# Patient Record
Sex: FEMALE | Race: WHITE | NOT HISPANIC OR LATINO | Employment: FULL TIME | URBAN - METROPOLITAN AREA
[De-identification: names, ages, dates, MRNs, and addresses within clinical notes are randomized per-mention and may not be internally consistent; named-entity substitution may affect disease eponyms.]

---

## 2019-06-26 PROBLEM — Z79.899 ENCOUNTER FOR LONG-TERM (CURRENT) USE OF MEDICATIONS: Status: ACTIVE | Noted: 2019-06-26

## 2019-06-26 PROBLEM — E06.3 HASHIMOTO'S DISEASE: Status: ACTIVE | Noted: 2019-06-19

## 2019-06-26 PROBLEM — R31.9 HEMATURIA, UNDIAGNOSED CAUSE: Status: ACTIVE | Noted: 2019-06-26

## 2019-06-26 PROBLEM — Z51.81 ENCOUNTER FOR THERAPEUTIC DRUG MONITORING: Status: ACTIVE | Noted: 2019-06-26

## 2019-06-26 PROBLEM — Z71.3 DIETARY COUNSELING: Status: ACTIVE | Noted: 2019-06-26

## 2019-06-26 PROBLEM — M62.830 SPASM OF LUMBAR PARASPINOUS MUSCLE: Status: ACTIVE | Noted: 2019-06-26

## 2019-06-26 PROBLEM — R00.2 PALPITATIONS: Status: ACTIVE | Noted: 2019-06-26

## 2019-06-26 PROBLEM — R10.9 ACUTE LEFT FLANK PAIN: Status: ACTIVE | Noted: 2019-06-26

## 2019-07-15 PROBLEM — Z09 FOLLOW-UP EXAMINATION FOLLOWING COMBINED TREATMENT: Status: ACTIVE | Noted: 2019-07-15

## 2019-09-10 PROBLEM — G93.32 CHRONIC FATIGUE SYNDROME: Status: ACTIVE | Noted: 2019-09-10

## 2019-09-10 PROBLEM — I49.3 PVC (PREMATURE VENTRICULAR CONTRACTION): Chronic | Status: ACTIVE | Noted: 2019-09-10

## 2019-09-10 PROBLEM — M62.831 CHARLEYHORSE: Status: ACTIVE | Noted: 2019-09-10

## 2019-09-10 PROBLEM — F15.90 OTHER STIMULANT USE, UNSPECIFIED, UNCOMPLICATED: Status: ACTIVE | Noted: 2019-09-10

## 2019-09-10 PROBLEM — G58.9 PINCHED NERVE: Chronic | Status: ACTIVE | Noted: 2018-11-01

## 2019-09-10 PROBLEM — Z86.19 HISTORY OF EPSTEIN-BARR VIRUS INFECTION: Status: ACTIVE | Noted: 2019-09-10

## 2019-10-14 PROBLEM — Z09 FOLLOW-UP EXAMINATION FOLLOWING COMBINED TREATMENT: Status: RESOLVED | Noted: 2019-07-15 | Resolved: 2019-10-14

## 2019-11-20 ENCOUNTER — HOSPITAL ENCOUNTER (EMERGENCY)
Facility: HOSPITAL | Age: 54
Discharge: HOME | End: 2019-11-20
Attending: EMERGENCY MEDICINE
Payer: COMMERCIAL

## 2019-11-20 VITALS
SYSTOLIC BLOOD PRESSURE: 120 MMHG | OXYGEN SATURATION: 98 % | RESPIRATION RATE: 16 BRPM | DIASTOLIC BLOOD PRESSURE: 79 MMHG | TEMPERATURE: 98 F | HEART RATE: 70 BPM

## 2019-11-20 DIAGNOSIS — R10.84 GENERALIZED ABDOMINAL PAIN: Primary | ICD-10-CM

## 2019-11-20 DIAGNOSIS — R07.9 CHEST PAIN: ICD-10-CM

## 2019-11-20 LAB
ALBUMIN SERPL BCP-MCNC: 4.3 G/DL (ref 3.5–5.2)
ALP SERPL-CCNC: 66 U/L (ref 55–135)
ALT SERPL W/O P-5'-P-CCNC: 16 U/L (ref 10–44)
AMYLASE SERPL-CCNC: 36 U/L (ref 20–110)
ANION GAP SERPL CALC-SCNC: 7 MMOL/L (ref 8–16)
AST SERPL-CCNC: 20 U/L (ref 10–40)
BACTERIA #/AREA URNS HPF: ABNORMAL /HPF
BASOPHILS # BLD AUTO: 0.02 K/UL (ref 0–0.2)
BASOPHILS NFR BLD: 0.4 % (ref 0–1.9)
BILIRUB SERPL-MCNC: 0.4 MG/DL (ref 0.1–1)
BILIRUB UR QL STRIP: NEGATIVE
BUN SERPL-MCNC: 12 MG/DL (ref 6–20)
CALCIUM SERPL-MCNC: 9.8 MG/DL (ref 8.7–10.5)
CHLORIDE SERPL-SCNC: 106 MMOL/L (ref 95–110)
CLARITY UR: CLEAR
CO2 SERPL-SCNC: 28 MMOL/L (ref 23–29)
COLOR UR: YELLOW
CREAT SERPL-MCNC: 0.8 MG/DL (ref 0.5–1.4)
DIFFERENTIAL METHOD: NORMAL
EOSINOPHIL # BLD AUTO: 0.1 K/UL (ref 0–0.5)
EOSINOPHIL NFR BLD: 0.9 % (ref 0–8)
ERYTHROCYTE [DISTWIDTH] IN BLOOD BY AUTOMATED COUNT: 11.9 % (ref 11.5–14.5)
EST. GFR  (AFRICAN AMERICAN): >60 ML/MIN/1.73 M^2
EST. GFR  (NON AFRICAN AMERICAN): >60 ML/MIN/1.73 M^2
GLUCOSE SERPL-MCNC: 109 MG/DL (ref 70–110)
GLUCOSE UR QL STRIP: NEGATIVE
HCT VFR BLD AUTO: 39.1 % (ref 37–48.5)
HGB BLD-MCNC: 12.9 G/DL (ref 12–16)
HGB UR QL STRIP: ABNORMAL
IMM GRANULOCYTES # BLD AUTO: 0.01 K/UL (ref 0–0.04)
IMM GRANULOCYTES NFR BLD AUTO: 0.2 % (ref 0–0.5)
KETONES UR QL STRIP: NEGATIVE
LEUKOCYTE ESTERASE UR QL STRIP: NEGATIVE
LIPASE SERPL-CCNC: 31 U/L (ref 4–60)
LYMPHOCYTES # BLD AUTO: 1.9 K/UL (ref 1–4.8)
LYMPHOCYTES NFR BLD: 35.8 % (ref 18–48)
MCH RBC QN AUTO: 29.5 PG (ref 27–31)
MCHC RBC AUTO-ENTMCNC: 33 G/DL (ref 32–36)
MCV RBC AUTO: 89 FL (ref 82–98)
MICROSCOPIC COMMENT: ABNORMAL
MONOCYTES # BLD AUTO: 0.3 K/UL (ref 0.3–1)
MONOCYTES NFR BLD: 5.2 % (ref 4–15)
NEUTROPHILS # BLD AUTO: 3.1 K/UL (ref 1.8–7.7)
NEUTROPHILS NFR BLD: 57.5 % (ref 38–73)
NITRITE UR QL STRIP: NEGATIVE
NRBC BLD-RTO: 0 /100 WBC
PH UR STRIP: 6 [PH] (ref 5–8)
PLATELET # BLD AUTO: 238 K/UL (ref 150–350)
PMV BLD AUTO: 9.3 FL (ref 9.2–12.9)
POTASSIUM SERPL-SCNC: 3.9 MMOL/L (ref 3.5–5.1)
PROT SERPL-MCNC: 7.4 G/DL (ref 6–8.4)
PROT UR QL STRIP: NEGATIVE
RBC # BLD AUTO: 4.38 M/UL (ref 4–5.4)
RBC #/AREA URNS HPF: 5 /HPF (ref 0–4)
SODIUM SERPL-SCNC: 141 MMOL/L (ref 136–145)
SP GR UR STRIP: 1.02 (ref 1–1.03)
SQUAMOUS #/AREA URNS HPF: 1 /HPF
URN SPEC COLLECT METH UR: ABNORMAL
UROBILINOGEN UR STRIP-ACNC: NEGATIVE EU/DL
WBC # BLD AUTO: 5.34 K/UL (ref 3.9–12.7)
WBC #/AREA URNS HPF: 1 /HPF (ref 0–5)

## 2019-11-20 PROCEDURE — 83690 ASSAY OF LIPASE: CPT

## 2019-11-20 PROCEDURE — 25000003 PHARM REV CODE 250: Performed by: EMERGENCY MEDICINE

## 2019-11-20 PROCEDURE — 85025 COMPLETE CBC W/AUTO DIFF WBC: CPT

## 2019-11-20 PROCEDURE — 63600175 PHARM REV CODE 636 W HCPCS: Performed by: EMERGENCY MEDICINE

## 2019-11-20 PROCEDURE — 36415 COLL VENOUS BLD VENIPUNCTURE: CPT

## 2019-11-20 PROCEDURE — 99284 EMERGENCY DEPT VISIT MOD MDM: CPT | Mod: 25

## 2019-11-20 PROCEDURE — 80053 COMPREHEN METABOLIC PANEL: CPT

## 2019-11-20 PROCEDURE — 81000 URINALYSIS NONAUTO W/SCOPE: CPT

## 2019-11-20 PROCEDURE — 82150 ASSAY OF AMYLASE: CPT

## 2019-11-20 RX ORDER — SIMETHICONE 80 MG
2 TABLET,CHEWABLE ORAL 3 TIMES DAILY PRN
Status: DISCONTINUED | OUTPATIENT
Start: 2019-11-20 | End: 2019-11-20 | Stop reason: HOSPADM

## 2019-11-20 RX ORDER — KETOROLAC TROMETHAMINE 30 MG/ML
60 INJECTION, SOLUTION INTRAMUSCULAR; INTRAVENOUS
Status: DISCONTINUED | OUTPATIENT
Start: 2019-11-20 | End: 2019-11-20 | Stop reason: HOSPADM

## 2019-11-20 RX ADMIN — LIDOCAINE HYDROCHLORIDE 50 ML: 20 SOLUTION ORAL; TOPICAL at 10:11

## 2019-11-20 RX ADMIN — SIMETHICONE CHEW TAB 80 MG 160 MG: 80 TABLET ORAL at 10:11

## 2019-11-20 NOTE — ED PROVIDER NOTES
[facility]                                                  Chief Complaint  54 y.o. female with Abdominal Pain    History of Present Illness  Ana Luke presents to the emergency room complaining of pain and bloating after an EGD yesterday.  Patient reports that she felt fine after the procedure but suddenly had bloating, diarrhea and pressure this morning.  She has not had any fever.  She denies chest pain shortness of breath. She does not feel dizzy or weak.    Past Medical History:   Diagnosis Date    Anhedonia     Autoimmune disorder     Decreased libido     Endometriosis     Esophagitis 05/2016    Caceres ANTHONY  Stage II    Female pattern hair loss     Fibrocystic breast changes of both breasts     Food intolerance in adult     wheat, egg whites, casein, corn, peanuts    GERD (gastroesophageal reflux disease)     Goiter, nontoxic simple     Hashimoto's disease 06/19/2019    Sissam    Hemorrhoids     flares with UC    Hiatal hernia     History of Merissa-Barr virus infection 2016    with CMV    History of pulmonary embolus (PE) 12/2003    TIA, from HRT    Hormone imbalance     HPV (human papilloma virus) infection 06/2014    Culotta    HSV-2 (herpes simplex virus 2) infection 2001    Hypoglycemia     Lower back pain     Menopausal and female climacteric states     Migraine with aura     Multiple food allergies     wheat, corn, casein, egg whites, peanuts    MVP (mitral valve prolapse) 08/1997    Pinched nerve 11/2018    LUMBAR SPASM L4-5    Pre-diabetes     PVC (premature ventricular contraction) 9/10/2019    Scoliosis of lumbar region due to degenerative disease of spine in adult     TMJ (temporomandibular joint disorder)     UC (ulcerative colitis)      Past Surgical History:   Procedure Laterality Date    CARPAL TUNNEL RELEASE Right 2005    CYSTOSCOPY  11/1998    Culotta endometriosis    EXPLORATORY LAPAROTOMY  02/1998    Ira    instrument removal   1997    Ira; tip of a suction catheter    LAPAROSCOPIC CHOLECYSTECTOMY  1996    Caceres ANTHONY    LAPAROTOMY, EXPLORATORY  02/1999    Culotta severe endometriosis    TOTAL ABDOMINAL HYSTERECTOMY W/ BILATERAL SALPINGOOPHORECTOMY  1997    Larisebaljeet      Review of patient's allergies indicates:   Allergen Reactions    Casein     Codeine     Corn containing products     Hydromorphone     Meperidine     Morphine     Peanut     Wheat containing prod         Review of Systems and Physical Exam     Review of Systems  -- Constitution - no fever, no weight loss, no loss of consciousness  -- Eyes - no changes in vision, no redness, no swelling  -- Ear, Nose - no  earache, denies congestion  -- Mouth,Throat - no sore throat, no toothache, normal voice, normal swallowing  -- Respiratory - denies cough and congestion, no shortness of breath, no wheezing  -- Cardiovascular - denies chest pain, no palpitations,   -- Gastrointestinal - reports abdominal pain with pressure and bloating, also has diarrhea  -- Genitourinary - no dysuria, no denies flank pain, no hematuria or frequency   -- Musculoskeletal - denies back pain, negative for myalgias and arthralgias   -- Neurological - no headache, no neurologic changes, no loss of bladder or bowel function no seizure like activity, no changes in hearing or vision  -- Skin - denies skin changes, no rash, no hives, no suspected skin infection    Vital Signs   oral temperature is 98.2 °F (36.8 °C). Her blood pressure is 117/57 (abnormal) and her pulse is 76. Her respiration is 16 and oxygen saturation is 98%.      Physical Exam  -- Nursing note and vitals reviewed  -- Constitutional:  Awake alert and oriented, GCS 15, no acute distress.  Appears well.  -- Head: Atraumatic. Normocephalic. No obvious abnormality  -- Eyes: Pupils are equal and reactive to light. Extraocular movements intact. No nystagmus.  No periorbital swelling. Normal conjunctiva.  -- Nose: Nose grossly normal in  appearance, nares grossly normal. No rhinorrhea.  -- Throat: Mucous membranes moist, pharynx normal, normal tonsils.  Airway patent.  -- Ears: External ears and TM normal bilaterally. Normal hearing.   -- Neck: Normal range of motion. Neck supple. No meningismus. No adenopathy  -- Cardiac: Normal rate, regular rhythm and normal heart sounds. No carotid bruit. No lower extremity edema.  -- Pulmonary: Normal respiratory effort, breath sounds equal bilaterally. Adequate flow.  No wheezing.  No crackles.  -- Abdominal: Soft, no tenderness, no guarding, no rebound. Normal bowel sounds.   -- Musculoskeletal: Normal range of motion, all 4 extremities 5/5 strength.  Neurovascularly intact. Atraumatic. No deformities.  -- Neurological:  Cranial nerves 2-12 grossly intact. No focal deficits.   -- Vascular: Posterior tibial, dorsalis pedis and radial pulses 2+ bilaterally    -- Lymphatics: No cervical or peripheral lymphadenopathy.   -- Skin: Warm and dry. No evidence of rash or cellulitis  -- Psychiatric: Normal mood and affect. Bedside behavior is appropriate.  Patient is cooperative.  Denies suicidal homicidal ideation.    Emergency Room Course     Treatment Course, Evaluation, and Medical Decision Making  1.  Physical exam unremarkable  2.  X-ray flat and erect negative for acute process no free air no obstruction  3.  GI cocktail x1  4.  Simethicone 160 p.o.  5.  CBC/CMP/lipase/amylase within limits  6.  Urinalysis positive for blood  7.  CT abdomen and pelvis negative for acute process  8.  Patient declined Toradol   9.  Discharge home follow-up primary care        Medications Given  Medications   simethicone chewable tablet 160 mg (160 mg Oral Given 11/20/19 1045)   GI cocktail (mylanta 30 mL, lidocaine 2 % viscous 10 mL, dicyclomine 10 mL) 50 mL (50 mLs Oral Given 11/20/19 1045)         Diagnosis  -- abdominal pain, diffuse    Disposition and Plan  -- Disposition: home  -- Condition: stable  -- Follow-up: Patient to  follow up with Rosita Gallo NP in 1-2 days, and any specialists noted on discharge paperwork  -- I advised the patient that we have found no life threatening condition today  -- At this time, I believe the patient is clinically stable for discharge.   -- The patient acknowledges that close follow up with a MD is required   -- Patient agrees to comply with all instruction and direction given in the ER  -- Patient counseled on strict return precautions as discussed       Alexandra Marmolejo MD  11/20/19 3180

## 2019-11-20 NOTE — ED NOTES
Pt resting comfortably on ED stretcher. NADN. AAOx3. Respirations even and unlabored. Bed locked in lowest position. Call bell within reach. Awaiting MD orders.   hard copy

## 2019-11-21 PROBLEM — N20.0 KIDNEY STONE ON LEFT SIDE: Status: ACTIVE | Noted: 2019-11-01

## 2021-04-14 PROBLEM — M62.831 CHARLEYHORSE: Status: RESOLVED | Noted: 2019-09-10 | Resolved: 2021-04-14

## 2021-05-04 ENCOUNTER — PATIENT MESSAGE (OUTPATIENT)
Dept: RESEARCH | Facility: HOSPITAL | Age: 56
End: 2021-05-04

## 2021-10-27 PROBLEM — R31.9 HEMATURIA, UNDIAGNOSED CAUSE: Status: RESOLVED | Noted: 2019-06-26 | Resolved: 2021-10-27

## 2021-10-27 PROBLEM — Z86.19 HISTORY OF EPSTEIN-BARR VIRUS INFECTION: Status: RESOLVED | Noted: 2019-09-10 | Resolved: 2021-10-27

## 2021-10-27 PROBLEM — R10.9 ACUTE LEFT FLANK PAIN: Status: RESOLVED | Noted: 2019-06-26 | Resolved: 2021-10-27

## 2021-10-27 PROBLEM — Z71.3 DIETARY COUNSELING: Status: RESOLVED | Noted: 2019-06-26 | Resolved: 2021-10-27

## 2021-10-27 PROBLEM — E11.9 DM2 (DIABETES MELLITUS, TYPE 2): Status: ACTIVE | Noted: 2021-09-01

## 2021-10-27 PROBLEM — M62.830 SPASM OF LUMBAR PARASPINOUS MUSCLE: Status: RESOLVED | Noted: 2019-06-26 | Resolved: 2021-10-27

## 2021-10-27 PROBLEM — E78.00 PURE HYPERCHOLESTEROLEMIA: Status: ACTIVE | Noted: 2021-10-27

## 2021-10-27 PROBLEM — N20.0 KIDNEY STONE ON LEFT SIDE: Status: RESOLVED | Noted: 2019-11-01 | Resolved: 2021-10-27

## 2022-01-10 PROBLEM — U07.1 COVID-19: Status: ACTIVE | Noted: 2022-01-08

## 2022-05-30 PROBLEM — U09.9 LONG COVID: Status: ACTIVE | Noted: 2022-05-30

## 2022-05-30 PROBLEM — Z79.899 ENCOUNTER FOR LONG-TERM (CURRENT) USE OF MEDICATIONS: Status: RESOLVED | Noted: 2019-06-26 | Resolved: 2022-05-30

## 2022-05-30 PROBLEM — Z86.16 HISTORY OF COVID-19: Status: ACTIVE | Noted: 2022-01-08

## 2022-05-30 PROBLEM — M15.9 GENERALIZED OSTEOARTHROSIS, INVOLVING MULTIPLE SITES: Status: ACTIVE | Noted: 2022-05-30

## 2022-05-30 PROBLEM — Z51.81 ENCOUNTER FOR THERAPEUTIC DRUG MONITORING: Status: RESOLVED | Noted: 2019-06-26 | Resolved: 2022-05-30

## 2022-06-15 PROBLEM — K76.0 FATTY LIVER DISEASE, NONALCOHOLIC: Status: ACTIVE | Noted: 2022-06-15

## 2022-06-22 PROBLEM — E78.2 MIXED HYPERLIPIDEMIA: Status: ACTIVE | Noted: 2021-10-27

## 2022-06-22 PROBLEM — R93.1 AGATSTON CORONARY ARTERY CALCIUM SCORE BETWEEN 100 AND 199: Status: ACTIVE | Noted: 2022-06-22

## 2023-01-30 PROBLEM — E56.9 MULTIPLE VITAMIN DEFICIENCY: Status: ACTIVE | Noted: 2023-01-30

## 2023-01-30 PROBLEM — E78.00 PURE HYPERCHOLESTEROLEMIA: Status: ACTIVE | Noted: 2023-01-30

## 2023-01-30 PROBLEM — E55.9 VITAMIN D DEFICIENCY: Status: ACTIVE | Noted: 2023-01-30

## 2023-04-03 PROBLEM — E78.2 MIXED HYPERLIPIDEMIA: Status: RESOLVED | Noted: 2021-10-27 | Resolved: 2023-04-03

## 2023-07-03 PROBLEM — Z86.16 HISTORY OF COVID-19: Status: RESOLVED | Noted: 2022-01-08 | Resolved: 2023-07-03

## 2023-10-30 PROBLEM — K21.00 GASTROESOPHAGEAL REFLUX DISEASE WITH ESOPHAGITIS WITHOUT HEMORRHAGE: Status: ACTIVE | Noted: 2023-10-30

## 2023-11-14 ENCOUNTER — OFFICE VISIT (OUTPATIENT)
Dept: OBSTETRICS AND GYNECOLOGY | Facility: CLINIC | Age: 58
End: 2023-11-14
Payer: COMMERCIAL

## 2023-11-14 VITALS
BODY MASS INDEX: 22.08 KG/M2 | HEIGHT: 66 IN | WEIGHT: 137.38 LBS | SYSTOLIC BLOOD PRESSURE: 118 MMHG | DIASTOLIC BLOOD PRESSURE: 76 MMHG | HEART RATE: 86 BPM

## 2023-11-14 DIAGNOSIS — Z90.722 HISTORY OF TOTAL HYSTERECTOMY WITH BILATERAL SALPINGO-OOPHORECTOMY (BSO): ICD-10-CM

## 2023-11-14 DIAGNOSIS — Z01.419 WELL WOMAN EXAM WITH ROUTINE GYNECOLOGICAL EXAM: Primary | ICD-10-CM

## 2023-11-14 DIAGNOSIS — Z90.710 HISTORY OF TOTAL HYSTERECTOMY WITH BILATERAL SALPINGO-OOPHORECTOMY (BSO): ICD-10-CM

## 2023-11-14 DIAGNOSIS — Z90.79 HISTORY OF TOTAL HYSTERECTOMY WITH BILATERAL SALPINGO-OOPHORECTOMY (BSO): ICD-10-CM

## 2023-11-14 PROCEDURE — 99386 PR PREVENTIVE VISIT,NEW,40-64: ICD-10-PCS | Mod: S$GLB,,, | Performed by: OBSTETRICS & GYNECOLOGY

## 2023-11-14 PROCEDURE — 99999 PR PBB SHADOW E&M-EST. PATIENT-LVL III: CPT | Mod: PBBFAC,,, | Performed by: OBSTETRICS & GYNECOLOGY

## 2023-11-14 PROCEDURE — 99386 PREV VISIT NEW AGE 40-64: CPT | Mod: S$GLB,,, | Performed by: OBSTETRICS & GYNECOLOGY

## 2023-11-14 PROCEDURE — 3044F PR MOST RECENT HEMOGLOBIN A1C LEVEL <7.0%: ICD-10-PCS | Mod: CPTII,S$GLB,, | Performed by: OBSTETRICS & GYNECOLOGY

## 2023-11-14 PROCEDURE — 1159F PR MEDICATION LIST DOCUMENTED IN MEDICAL RECORD: ICD-10-PCS | Mod: CPTII,S$GLB,, | Performed by: OBSTETRICS & GYNECOLOGY

## 2023-11-14 PROCEDURE — 3008F BODY MASS INDEX DOCD: CPT | Mod: CPTII,S$GLB,, | Performed by: OBSTETRICS & GYNECOLOGY

## 2023-11-14 PROCEDURE — 99999 PR PBB SHADOW E&M-EST. PATIENT-LVL III: ICD-10-PCS | Mod: PBBFAC,,, | Performed by: OBSTETRICS & GYNECOLOGY

## 2023-11-14 PROCEDURE — 3074F SYST BP LT 130 MM HG: CPT | Mod: CPTII,S$GLB,, | Performed by: OBSTETRICS & GYNECOLOGY

## 2023-11-14 PROCEDURE — 3078F PR MOST RECENT DIASTOLIC BLOOD PRESSURE < 80 MM HG: ICD-10-PCS | Mod: CPTII,S$GLB,, | Performed by: OBSTETRICS & GYNECOLOGY

## 2023-11-14 PROCEDURE — 3008F PR BODY MASS INDEX (BMI) DOCUMENTED: ICD-10-PCS | Mod: CPTII,S$GLB,, | Performed by: OBSTETRICS & GYNECOLOGY

## 2023-11-14 PROCEDURE — 3044F HG A1C LEVEL LT 7.0%: CPT | Mod: CPTII,S$GLB,, | Performed by: OBSTETRICS & GYNECOLOGY

## 2023-11-14 PROCEDURE — 3078F DIAST BP <80 MM HG: CPT | Mod: CPTII,S$GLB,, | Performed by: OBSTETRICS & GYNECOLOGY

## 2023-11-14 PROCEDURE — 1159F MED LIST DOCD IN RCRD: CPT | Mod: CPTII,S$GLB,, | Performed by: OBSTETRICS & GYNECOLOGY

## 2023-11-14 PROCEDURE — 3074F PR MOST RECENT SYSTOLIC BLOOD PRESSURE < 130 MM HG: ICD-10-PCS | Mod: CPTII,S$GLB,, | Performed by: OBSTETRICS & GYNECOLOGY

## 2023-11-14 NOTE — PROGRESS NOTES
Subjective:    Patient ID: Ana Luke is a 58 y.o. y.o. female.     Chief Complaint: Annual Well Woman Exam     History of Present Illness:  Ana presents today for Annual Well Woman exam. She describes her menses as  absent, pt had a TVH with BSO.  She has a h/o endometriosis .She denies pelvic pain.  She denies breast tenderness, masses, nipple discharge. She denies GYN complaints. She denies difficulty with urination. She denies bloating, early satiety, or weight changes. She is sexually active.     Menstrual History:   No LMP recorded (lmp unknown). Patient has had a hysterectomy..     OB History    : 3  Para: 2  Term: 2  : 1  Spontaneous : 1            The following portions of the patient's history were reviewed and updated as appropriate: allergies, current medications, past family history, past medical history, past social history, past surgical history, and problem list.    ROS:   Review of Systems   Constitutional:  Negative for chills, diaphoresis, fatigue, fever and unexpected weight change.   HENT:  Negative for congestion, hearing loss, postnasal drip, rhinorrhea, sinus pressure, sinus pain, sore throat and tinnitus.    Eyes:  Negative for pain, discharge and visual disturbance.   Respiratory:  Negative for apnea, cough, shortness of breath and wheezing.    Cardiovascular:  Negative for chest pain, palpitations and leg swelling.   Gastrointestinal:  Negative for abdominal pain, constipation, diarrhea, nausea and vomiting.   Endocrine: Negative for cold intolerance, heat intolerance, polydipsia, polyphagia and polyuria.   Genitourinary:  Negative for difficulty urinating, dyspareunia, dysuria, enuresis, flank pain, frequency, genital sores, hematuria, menstrual problem, pelvic pain, urgency, vaginal bleeding, vaginal discharge and vaginal pain.   Musculoskeletal:  Negative for arthralgias, back pain, joint swelling, myalgias, neck pain and neck stiffness.   Skin:   Negative for color change, pallor and rash.   Allergic/Immunologic: Negative for environmental allergies, food allergies and immunocompromised state.   Neurological:  Negative for dizziness, weakness, light-headedness, numbness and headaches.   Hematological:  Negative for adenopathy. Does not bruise/bleed easily.   Psychiatric/Behavioral:  Negative for agitation and confusion. The patient is not nervous/anxious.          Objective:    Vital Signs:  Vitals:    11/14/23 1243   BP: 118/76   Pulse: 86       Physical Exam:  General:  alert, cooperative, no distress   Skin:  Skin color, texture, turgor normal. No rashes or lesions   HEENT:  extra ocular movement intact, sclera clear, anicteric   Neck: supple, trachea midline, no adenopathy or thyromegally   Respiratory:  Normal effort   Breasts:  symmetric fibrous changes in both upper outer quadrants, no discharge, erythema, tenderness, or palpable masses; no axillary lymphadenopathy   Abdomen:  soft, nontender, no palpable masses   Pelvis: External genitalia: normal general appearance  Urinary system: urethral meatus normal, bladder nontender  Vaginal: normal mucosa without prolapse or lesions  Cervix: removed surgically  Uterus: removed surgically  Adnexa: removed surgically   Extremities: Normal ROM; no edema, no cyanosis   Neurologial: Normal strength and tone. No focal numbness or weakness.   Psychiatric: normal mood, speech, dress, and thought processes         Assessment:       Healthy female exam.     1. Well woman exam with routine gynecological exam    2. History of total hysterectomy with bilateral salpingo-oophorectomy (BSO)          Plan:      Well woman exam with routine gynecological exam    History of total hysterectomy with bilateral salpingo-oophorectomy (BSO)        MMG negative and up to date.    COUNSELING:  Ana was counseled on A.C.O.G. Pap guidelines and recommendations for yearly pelvic exams in addition to recommendations for monthly self  breast exams; to see her PCP for other health maintenance.

## 2024-04-30 PROBLEM — E11.69 TYPE 2 DIABETES MELLITUS WITH OTHER SPECIFIED COMPLICATION, WITHOUT LONG-TERM CURRENT USE OF INSULIN: Status: ACTIVE | Noted: 2021-09-01

## 2024-05-22 ENCOUNTER — TELEPHONE (OUTPATIENT)
Dept: PHARMACY | Facility: CLINIC | Age: 59
End: 2024-05-22
Payer: COMMERCIAL

## 2024-07-01 ENCOUNTER — TELEPHONE (OUTPATIENT)
Dept: PHARMACY | Facility: CLINIC | Age: 59
End: 2024-07-01
Payer: COMMERCIAL

## 2024-07-22 PROBLEM — I49.3 PVC (PREMATURE VENTRICULAR CONTRACTION): Chronic | Status: ACTIVE | Noted: 2019-06-26

## 2024-07-22 PROBLEM — E56.9 MULTIPLE VITAMIN DEFICIENCY: Status: RESOLVED | Noted: 2023-01-30 | Resolved: 2024-07-22

## 2024-07-22 PROBLEM — K21.9 HIATAL HERNIA WITH GERD WITHOUT ESOPHAGITIS: Status: ACTIVE | Noted: 2023-10-30

## 2024-07-22 PROBLEM — K44.9 HIATAL HERNIA WITH GERD WITHOUT ESOPHAGITIS: Status: ACTIVE | Noted: 2023-10-30

## 2024-07-30 ENCOUNTER — ON-DEMAND VIRTUAL (OUTPATIENT)
Dept: URGENT CARE | Facility: CLINIC | Age: 59
End: 2024-07-30
Payer: COMMERCIAL

## 2024-07-30 DIAGNOSIS — U07.1 COVID-19: Primary | ICD-10-CM

## 2024-07-30 PROCEDURE — 99212 OFFICE O/P EST SF 10 MIN: CPT | Mod: 95,,, | Performed by: NURSE PRACTITIONER

## 2024-07-30 RX ORDER — NIRMATRELVIR AND RITONAVIR 300-100 MG
KIT ORAL
Qty: 30 TABLET | Refills: 0 | Status: SHIPPED | OUTPATIENT
Start: 2024-07-30 | End: 2024-08-04

## 2024-07-31 NOTE — PROGRESS NOTES
Subjective:      Patient ID: Ana Luke is a 58 y.o. female.    Vitals:  vitals were not taken for this visit.     Chief Complaint: COVID-19 Concerns      Visit Type: TELE AUDIOVISUAL    Present with the patient at the time of consultation: TELEMED PRESENT WITH PATIENT: None    Past Medical History:   Diagnosis Date    Abnormal Pap smear of cervix     years ago, colpo done, nl since    Agatston coronary artery calcium score between 100 and 199 06/22/2022    Total 167; , CFX 42, RCA 0, LM 0    Autoimmune disorder     COVID-19 01/08/2022    MAB injection    Decreased libido     DM2 (diabetes mellitus, type 2) 09/2021    Dr. Srinivasan     Endometriosis     Esophageal stricture 11/2019 [facility]    Esophagitis 05/2016    Caceres ANTHONY  Stage II    Fatty liver disease, nonalcoholic 06/15/2022    Female pattern hair loss 08/07/2023    Indira Mccartney; started Derive injections    Fibrocystic breast changes of both breasts     Food intolerance in adult     wheat, egg whites, casein, corn, peanuts    GERD (gastroesophageal reflux disease)     Goiter, nontoxic simple     Hashimoto's disease 06/19/2019    Zarina    Hemorrhoids     flares with UC    Hiatal hernia     History of Merissa-Barr virus infection 2016    with CMV    History of pulmonary embolus (PE) 12/2003    TIA, from HRT    Hormone imbalance     HPV (human papilloma virus) infection 06/2014    Culotta    HSV-2 (herpes simplex virus 2) infection 2001    Hypoglycemia     Kidney stone on left side 11/2019    Long COVID 05/30/2022    Menopausal and female climacteric states     Migraine with aura     Mixed hyperlipidemia 10/27/2021    Multiple food allergies     wheat, corn, casein, egg whites, peanuts    MVP (mitral valve prolapse) 08/1997    Pinched nerve 11/2018    LUMBAR SPASM L4-5    Pure hypercholesterolemia 10/27/2021    PVC (premature ventricular contraction) 6/26/2019    Scoliosis of lumbar region due to degenerative disease of spine in adult     TMJ  (temporomandibular joint disorder)     UC (ulcerative colitis)      Past Surgical History:   Procedure Laterality Date    CARPAL TUNNEL RELEASE Right 2005    CYSTOSCOPY  11/1998    Culotta endometriosis    ESOPHAGEAL DILATION  11/2019 [facility]    EXPLORATORY LAPAROTOMY  02/1998    Larisey    instrument removal  1997    Larjosey; tip of a suction catheter    LAPAROSCOPIC CHOLECYSTECTOMY  1996 [facility] ANTHONY    LAPAROTOMY, EXPLORATORY  02/1999    Culotta severe endometriosis    TOTAL ABDOMINAL HYSTERECTOMY W/ BILATERAL SALPINGOOPHORECTOMY  1997    Larisey     Review of patient's allergies indicates:   Allergen Reactions    Metformin      Interfered with hormone levels      Casein     Codeine     Corn containing products     Hydromorphone     Meperidine     Morphine     Peanut     Wheat containing prod      Current Outpatient Medications on File Prior to Visit   Medication Sig Dispense Refill    ARMOUR THYROID 15 mg Tab TAKE 1 TABLET (15 MG TOTAL) BY MOUTH BEFORE BREAKFAST. (Patient taking differently: 75 mg. 2 15mg tablets in am and 2 15mg tablet in the evening) 30 tablet 5    CMPD testosterone proprionate 2% in vanicream Apply 2 mg topically every other day.      diethylpropion (TENUATE) 75 mg SR tablet TAKE 1 TABLET BY MOUTH EVERY MORNING BEFORE A MEAL. 30 tablet 5    ergocalciferol (ERGOCALCIFEROL) 50,000 unit Cap TAKE 1 CAPSULE BY MOUTH ONE TIME PER WEEK 12 capsule 3    EScitalopram oxalate (LEXAPRO) 10 MG tablet TAKE 1 TABLET BY MOUTH EVERY DAY 90 tablet 3    estradioL (VIVELLE-DOT) 0.0375 mg/24 hr Place 1 patch onto the skin twice a week. 8 patch 11    famotidine (PEPCID) 40 MG tablet TAKE 1 TABLET BY MOUTH EVERY DAY 30 tablet 11    nebivoloL (BYSTOLIC) 5 MG Tab TAKE 1 TABLET BY MOUTH EVERYDAY AT BEDTIME 90 tablet 3    OZEMPIC 1 mg/dose (4 mg/3 mL) Inject 1 mg into the skin every 7 days.      pantoprazole (PROTONIX) 40 MG tablet       rosuvastatin (CRESTOR) 10 MG tablet Take 10 mg by mouth.      timolol maleate  0.5% (TIMOPTIC) 0.5 % Drop Place 1 drop into both eyes 2 (two) times daily.       No current facility-administered medications on file prior to visit.     Family History   Problem Relation Name Age of Onset    Breast cancer Maternal Grandmother      Lung cancer Father      COPD Father      Heart attack Father          age 55    Coronary artery disease Father      Heart attacks under age 50 Father          brother at 40    Raynaud syndrome Mother Jomar     Cancer Brother          Bile duct; wife diagnosed same 3 months later.  Worked in [email or web address] plant     Ovarian cancer Neg Hx      Colon cancer Neg Hx         Medications Ordered                [facility] [id number] [facility] [employer] - [place], [place] - [address]   [address], [place] [address]    Telephone: [phone]   Fax: [phone]   Hours: Not open 24 hours                         E-Prescribed (1 of 1)              nirmatrelvir-ritonavir (PAXLOVID) 300 mg (150 mg x 2)-100 mg copackaged tablets (EUA)    Sig: Take 3 tablets by mouth 2 (two) times daily. Each dose contains 2 nirmatrelvir (pink tablets) and 1 ritonavir (white tablet). Take all 3 tablets together       Start: 7/30/24     Quantity: 30 tablet Refills: 0                           [facility]s Peq Odvv Intake    7/30/2024  8:18 PM CDT - Filed by Patient   What is your current physical address in the event of a medical emergency?    Are you able to take your vital signs? Yes   Systolic Blood Pressure: 123   Diastolic Blood Pressure: 53   Weight: 129   Height: 65   Pulse: 96   Temperature: 99.3   Respiration rate:    Pulse Oxygen:    Please attach any relevant images or files          Patient states visiting from [place]. C/o runny nose, sore throat , cough, sx onset yesterday   Home covid test today         Constitution: Negative for chills, sweating, fatigue and fever.   HENT:  Positive for congestion and sore throat. Negative for ear pain, ear discharge, tinnitus, hearing loss, sinus pain,  sinus pressure, trouble swallowing and voice change.    Neck: Negative for neck pain and painful lymph nodes.   Cardiovascular:  Negative for chest pain, palpitations and sob on exertion.   Respiratory:  Positive for cough. Negative for sputum production, shortness of breath and wheezing.    Gastrointestinal:  Negative for abdominal pain, nausea, vomiting and diarrhea.   Musculoskeletal:  Negative for muscle ache.   Skin:  Negative for color change, pale and rash.   Allergic/Immunologic: Negative for sneezing.   Neurological:  Negative for headaches, numbness and tingling.   Hematologic/Lymphatic: Negative for swollen lymph nodes.        Objective:   The physical exam was conducted virtually.  Physical Exam   Constitutional: She is oriented to person, place, and time. No distress.   HENT:   Head: Normocephalic and atraumatic.   Nose: No rhinorrhea or congestion.   Mouth/Throat: Oropharynx is clear and moist and mucous membranes are normal.   Eyes: Conjunctivae are normal. No scleral icterus.   Pulmonary/Chest: Effort normal. No respiratory distress.   Musculoskeletal: Normal range of motion.         General: Normal range of motion.   Neurological: She is alert and oriented to person, place, and time.   Skin: Skin is not diaphoretic.   Psychiatric: Her behavior is normal. Judgment and thought content normal.   Vitals reviewed.      Assessment:     1. COVID-19        Plan:       COVID-19  -     nirmatrelvir-ritonavir (PAXLOVID) 300 mg (150 mg x 2)-100 mg copackaged tablets (EUA); Take 3 tablets by mouth 2 (two) times daily. Each dose contains 2 nirmatrelvir (pink tablets) and 1 ritonavir (white tablet). Take all 3 tablets together  Dispense: 30 tablet; Refill: 0                    Patient Instructions   See additional patient Instructions provided    - Rest.    - Drink plenty of fluids.  - Bacterial infections can be treated with oral antibiotics, however, Viral upper respiratory infections will not respond to  antibiotics but with simple symptomatic care, typically run their course in 10-14 days.     - Tylenol or Ibuprofen as directed as needed for fever/pain. Avoid tylenol if you have a history of liver disease. Do not take ibuprofen if you have a history of GI bleeding, kidney disease, or if you take blood thinners.     - You can take over-the-counter claritin, zyrtec, allegra, or xyzal as directed. These are antihistamines that can help with runny nose, nasal congestion, sneezing, and helps to dry up post-nasal drip, which usually causes sore throat and cough.   - If you do NOT have high blood pressure, you may use a decongestant form (D)  of this medication (ie. Claritin- D, zyrtec-D, allegra-D) or if you do not take the D form, you can take sudafed (pseudoephedrine) over the counter, which is a decongestant. Do NOT take two decongestant (D) medications at the same time (such as mucinex-D and claritin-D or plain sudafed and claritin D)    - You can use Flonase (fluticasone) nasal spray as directed for sinus congestion and postnasal drip. This is a steroid nasal spray that works locally over time to decrease the inflammation in your nose/sinuses and help with allergic symptoms. This is not an quick- relief spray like afrin, but it works well if used daily.  Discontinue if you develop nose bleed  - use nasal saline prior to Flonase.  - Use Ocean Spray Nasal Saline 1-3 puffs each nostril every 2-3 hours then blow out onto tissue. This is to irrigate the nasal passage way to clear the sinus openings. Use until sinus problem resolved.    - you can take plain Mucinex (guaifenesin) 1200 mg twice a day to help loosen mucous.     -warm salt water gargles can help with sore throat    - warm tea with honey can help with cough. Honey is a natural cough suppressant.    - Dextromethorphan (DM) is a cough suppressant over the counter (ie. mucinex DM, robitussin, delsym; dayquil/nyquil has DM as well.)    - Follow up with your PCP or  specialty clinic as directed in the next 1-2 weeks if not improved or as needed.  You can call [phone] to schedule an appointment with the appropriate provider.      - Go to the ER if you develop new or worsening symptoms.     - You must understand that you have received an Urgent Care treatment only and that you may be released before all of your medical problems are known or treated.   - You, the patient, will arrange for follow up care as instructed.   - If your condition worsens or fails to improve we recommend that you receive another evaluation at the ER immediately or contact your PCP to discuss your concerns or return here.     Patient Instructions   - You must understand that you have received an Urgent Care treatment only and that you may be released before all of your medical problems are known or treated.   - You, the patient, will arrange for follow up care as instructed.   - If your condition worsens or fails to improve we recommend that you receive another evaluation at the ER immediately or contact your PCP to discuss your concerns or return here.     Advised on return/follow-up precautions. Advised on ER precautions. Answered all patient questions. Patient verbalized understanding and voiced agreement with current treatment plan.

## 2024-07-31 NOTE — PATIENT INSTRUCTIONS
See additional patient Instructions provided    - Rest.    - Drink plenty of fluids.  - Bacterial infections can be treated with oral antibiotics, however, Viral upper respiratory infections will not respond to antibiotics but with simple symptomatic care, typically run their course in 10-14 days.     - Tylenol or Ibuprofen as directed as needed for fever/pain. Avoid tylenol if you have a history of liver disease. Do not take ibuprofen if you have a history of GI bleeding, kidney disease, or if you take blood thinners.     - You can take over-the-counter claritin, zyrtec, allegra, or xyzal as directed. These are antihistamines that can help with runny nose, nasal congestion, sneezing, and helps to dry up post-nasal drip, which usually causes sore throat and cough.   - If you do NOT have high blood pressure, you may use a decongestant form (D)  of this medication (ie. Claritin- D, zyrtec-D, allegra-D) or if you do not take the D form, you can take sudafed (pseudoephedrine) over the counter, which is a decongestant. Do NOT take two decongestant (D) medications at the same time (such as mucinex-D and claritin-D or plain sudafed and claritin D)    - You can use Flonase (fluticasone) nasal spray as directed for sinus congestion and postnasal drip. This is a steroid nasal spray that works locally over time to decrease the inflammation in your nose/sinuses and help with allergic symptoms. This is not an quick- relief spray like afrin, but it works well if used daily.  Discontinue if you develop nose bleed  - use nasal saline prior to Flonase.  - Use Ocean Spray Nasal Saline 1-3 puffs each nostril every 2-3 hours then blow out onto tissue. This is to irrigate the nasal passage way to clear the sinus openings. Use until sinus problem resolved.    - you can take plain Mucinex (guaifenesin) 1200 mg twice a day to help loosen mucous.     -warm salt water gargles can help with sore throat    - warm tea with honey can help with  cough. Honey is a natural cough suppressant.    - Dextromethorphan (DM) is a cough suppressant over the counter (ie. mucinex DM, robitussin, delsym; dayquil/nyquil has DM as well.)    - Follow up with your PCP or specialty clinic as directed in the next 1-2 weeks if not improved or as needed.  You can call [phone] to schedule an appointment with the appropriate provider.      - Go to the ER if you develop new or worsening symptoms.     - You must understand that you have received an Urgent Care treatment only and that you may be released before all of your medical problems are known or treated.   - You, the patient, will arrange for follow up care as instructed.   - If your condition worsens or fails to improve we recommend that you receive another evaluation at the ER immediately or contact your PCP to discuss your concerns or return here.     Patient Instructions   - You must understand that you have received an Urgent Care treatment only and that you may be released before all of your medical problems are known or treated.   - You, the patient, will arrange for follow up care as instructed.   - If your condition worsens or fails to improve we recommend that you receive another evaluation at the ER immediately or contact your PCP to discuss your concerns or return here.     Advised on return/follow-up precautions. Advised on ER precautions. Answered all patient questions. Patient verbalized understanding and voiced agreement with current treatment plan.

## 2024-08-01 PROBLEM — U07.1 COVID-19 VIRUS INFECTION: Status: ACTIVE | Noted: 2024-07-30

## 2024-10-14 PROBLEM — U07.1 COVID-19 VIRUS INFECTION: Status: RESOLVED | Noted: 2024-07-30 | Resolved: 2024-10-14

## 2024-10-14 PROBLEM — I10 ESSENTIAL HYPERTENSION, BENIGN: Status: ACTIVE | Noted: 2024-10-14

## 2024-10-15 ENCOUNTER — TELEPHONE (OUTPATIENT)
Dept: PHARMACY | Facility: CLINIC | Age: 59
End: 2024-10-15
Payer: COMMERCIAL

## 2024-10-15 NOTE — TELEPHONE ENCOUNTER
[facility]/Population Health Chart Review & Patient Outreach Details For Medication Adherence Project    Reason for Outreach Encounter: 3rd Party payor non-compliance report ([employer], [employer], [employer], etc)  2.  Patient Outreach Method: Reviewed Patient Chart  3.   Medication in question: rosuvastatin    LAST FILLED: 8/23/24 for 90 day supply  Hyperlipidemia Medications               rosuvastatin (CRESTOR) 10 MG tablet Take 10 mg by mouth.               4.  Reviewed and or Updates Made To: Patient Chart  5. Outreach Outcomes and/or actions taken: Patient filled medication and is on track to be adherent

## 2024-12-29 ENCOUNTER — TELEPHONE (OUTPATIENT)
Dept: PHARMACY | Facility: CLINIC | Age: 59
End: 2024-12-29
Payer: COMMERCIAL

## 2024-12-29 NOTE — TELEPHONE ENCOUNTER
[facility]/Population Health Chart Review & Patient Outreach Details For Medication Adherence Project    Reason for Outreach Encounter: 3rd Party payor non-compliance report ([employer], [employer], [employer], etc)  2.  Patient Outreach Method: Reviewed patient chart   3.   Medication in question:    Hyperlipidemia Medications               rosuvastatin (CRESTOR) 10 MG tablet Take 10 mg by mouth.                  rosuvastatin  last filled  11/6 for 90 day supply      4.  Reviewed and or Updates Made To: Patient Chart  5. Outreach Outcomes and/or actions taken: Patient filled medication and is on track to be adherent  Additional Notes:

## 2025-01-15 ENCOUNTER — TELEPHONE (OUTPATIENT)
Dept: PHARMACY | Facility: CLINIC | Age: 60
End: 2025-01-15
Payer: COMMERCIAL

## 2025-01-16 NOTE — TELEPHONE ENCOUNTER
[facility]/Population Health Chart Review & Patient Outreach Details For Medication Adherence Project    Reason for Outreach Encounter: 3rd Party payor non-compliance report ([employer], [employer], [employer], etc)  2.  Patient Outreach Method: Reviewed patient chart   3.   Medication in question:    Hyperlipidemia Medications               rosuvastatin (CRESTOR) 10 MG tablet Take 10 mg by mouth.                    last filled  11/6/24 for 90 day supply      4.  Reviewed and or Updates Made To: Patient Chart  5. Outreach Outcomes and/or actions taken: Patient filled medication and is on track to be adherent  Additional Notes: